# Patient Record
Sex: FEMALE | URBAN - METROPOLITAN AREA
[De-identification: names, ages, dates, MRNs, and addresses within clinical notes are randomized per-mention and may not be internally consistent; named-entity substitution may affect disease eponyms.]

---

## 2018-12-07 ENCOUNTER — IMPORTED ENCOUNTER (OUTPATIENT)
Dept: URBAN - METROPOLITAN AREA CLINIC 31 | Facility: CLINIC | Age: 28
End: 2018-12-07

## 2019-03-08 ENCOUNTER — IMPORTED ENCOUNTER (OUTPATIENT)
Dept: URBAN - METROPOLITAN AREA CLINIC 31 | Facility: CLINIC | Age: 29
End: 2019-03-08

## 2019-03-08 PROCEDURE — 66999 UNLISTED PX ANT SEGMENT EYE: CPT

## 2019-03-09 ENCOUNTER — IMPORTED ENCOUNTER (OUTPATIENT)
Dept: URBAN - METROPOLITAN AREA CLINIC 31 | Facility: CLINIC | Age: 29
End: 2019-03-09

## 2019-03-09 PROCEDURE — 99024 POSTOP FOLLOW-UP VISIT: CPT

## 2019-03-09 NOTE — PATIENT DISCUSSION
s/p Dinorah 85 doing well. PO drops as directed on instruction sheet. BCL to remain until surface healed tears prn. Call with any problems.  F/U in 2 days

## 2019-03-11 ENCOUNTER — IMPORTED ENCOUNTER (OUTPATIENT)
Dept: URBAN - METROPOLITAN AREA CLINIC 31 | Facility: CLINIC | Age: 29
End: 2019-03-11

## 2019-03-11 PROCEDURE — 99024 POSTOP FOLLOW-UP VISIT: CPT

## 2019-03-11 NOTE — PATIENT DISCUSSION
s/p Dinorah 85 doing well. PO drops as directed on instruction sheet. BCL removed today. tears prn. Call with any problems.

## 2019-04-29 ENCOUNTER — IMPORTED ENCOUNTER (OUTPATIENT)
Dept: URBAN - METROPOLITAN AREA CLINIC 31 | Facility: CLINIC | Age: 29
End: 2019-04-29

## 2019-04-29 PROCEDURE — 99024 POSTOP FOLLOW-UP VISIT: CPT

## 2019-04-29 NOTE — PATIENT DISCUSSION
s/p Dinorah 85 doing well. Call with any problems. Return for an appointment for post op exam. in march with Dr. Annamary Mcardle.

## 2022-04-02 ASSESSMENT — VISUAL ACUITY
OD_CC: 20/20
OU_CC: 20/20
OD_CC: 20/50
OS_CC: 20/25-2
OD_CC: 20/400
OD_CC: 20/40-2
OS_CC: 20/200
OS_CC: 20/30
OS_CC: 20/50
OS_CC: 20/30-2
OD_CC: 20/25